# Patient Record
Sex: FEMALE | URBAN - METROPOLITAN AREA
[De-identification: names, ages, dates, MRNs, and addresses within clinical notes are randomized per-mention and may not be internally consistent; named-entity substitution may affect disease eponyms.]

---

## 2024-05-10 ENCOUNTER — APPOINTMENT (RX ONLY)
Dept: URBAN - METROPOLITAN AREA CLINIC 77 | Facility: CLINIC | Age: 34
Setting detail: DERMATOLOGY
End: 2024-05-10

## 2024-05-10 DIAGNOSIS — Z71.89 OTHER SPECIFIED COUNSELING: ICD-10-CM

## 2024-05-10 DIAGNOSIS — L85.3 XEROSIS CUTIS: ICD-10-CM

## 2024-05-10 DIAGNOSIS — D22 MELANOCYTIC NEVI: ICD-10-CM

## 2024-05-10 DIAGNOSIS — L57.8 OTHER SKIN CHANGES DUE TO CHRONIC EXPOSURE TO NONIONIZING RADIATION: ICD-10-CM

## 2024-05-10 DIAGNOSIS — L82.1 OTHER SEBORRHEIC KERATOSIS: ICD-10-CM

## 2024-05-10 PROBLEM — D48.5 NEOPLASM OF UNCERTAIN BEHAVIOR OF SKIN: Status: ACTIVE | Noted: 2024-05-10

## 2024-05-10 PROBLEM — D22.5 MELANOCYTIC NEVI OF TRUNK: Status: ACTIVE | Noted: 2024-05-10

## 2024-05-10 PROCEDURE — ? BIOPSY BY SHAVE METHOD

## 2024-05-10 PROCEDURE — ? COUNSELING

## 2024-05-10 PROCEDURE — ? SUNSCREEN RECOMMENDATIONS

## 2024-05-10 PROCEDURE — ? EDUCATIONAL RESOURCES PROVIDED

## 2024-05-10 PROCEDURE — ? PRESCRIPTION SAMPLES PROVIDED

## 2024-05-10 PROCEDURE — 99204 OFFICE O/P NEW MOD 45 MIN: CPT | Mod: 25

## 2024-05-10 PROCEDURE — 11102 TANGNTL BX SKIN SINGLE LES: CPT

## 2024-05-10 ASSESSMENT — LOCATION DETAILED DESCRIPTION DERM
LOCATION DETAILED: RIGHT LATERAL SUPERIOR CHEST
LOCATION DETAILED: EPIGASTRIC SKIN
LOCATION DETAILED: LEFT SUPERIOR UPPER BACK
LOCATION DETAILED: LEFT MID-UPPER BACK

## 2024-05-10 ASSESSMENT — LOCATION SIMPLE DESCRIPTION DERM
LOCATION SIMPLE: ABDOMEN
LOCATION SIMPLE: LEFT UPPER BACK
LOCATION SIMPLE: CHEST

## 2024-05-10 ASSESSMENT — LOCATION ZONE DERM: LOCATION ZONE: TRUNK

## 2024-05-10 NOTE — HPI: SKIN LESION
Is This A New Presentation, Or A Follow-Up?: Moles
What Type Of Note Output Would You Prefer (Optional)?: Standard Output
Additional History: Pt here to establish baseline skin exam with new dermatologist. Pt has mole on the back that she feels often has a sensitive feeling. She would like to have the mole checked for further evaluation to make sure not cancerous. Pt also complains of itchy irritation alone the neck she would like to have evaluated